# Patient Record
Sex: FEMALE | Race: WHITE | NOT HISPANIC OR LATINO | URBAN - METROPOLITAN AREA
[De-identification: names, ages, dates, MRNs, and addresses within clinical notes are randomized per-mention and may not be internally consistent; named-entity substitution may affect disease eponyms.]

---

## 2017-04-24 NOTE — PATIENT DISCUSSION
Continue: PreserVision AREDS 2 (vit c,e-ko-lxewc-lutein-zeaxan): capsule: [id number] mg-unit-mg-mg 1 capsule twice a day by mouth

## 2017-04-24 NOTE — PATIENT DISCUSSION
*CATARACTS, OU - BECOMING VISUALLY SIGNIFICANT BUT NOT BOTHERSOME TO PATIENT AT THIS TIME. SPEC RX OFFERED. FOLLOW AS SCHEDULED. WILL CONSIDER SURGERY IN 2018 OR 2019.

## 2017-04-24 NOTE — PATIENT DISCUSSION
Eyelid Lesion Counseling: The patient has presented with one or more eyelid or facial lesions with growth, change, irritation or abnormal appearance. An excision was recommended. The patient was offered a consultation appointment with an ocular plastics MD for further evaluation.

## 2017-07-26 ENCOUNTER — IMPORTED ENCOUNTER (OUTPATIENT)
Dept: URBAN - METROPOLITAN AREA CLINIC 9 | Facility: CLINIC | Age: 57
End: 2017-07-26

## 2018-04-18 NOTE — PATIENT DISCUSSION
FILEMON OU:  PRESCRIBE ARTIFICIAL TEARS BID - QID. DECREASE OUTDOOR EXPOSURE AND USE UV PROTECTION/ SUNGLASSES WITH OUTDOOR ACTIVITIES. RETURN FOR FOLLOW UP AS SCHEDULED.

## 2018-04-18 NOTE — PATIENT DISCUSSION
Continue: PreserVision AREDS 2 (vit c,c-xt-oifmg-lutein-zeaxan): capsule: [id number] mg-unit-mg-mg 1 capsule twice a day by mouth

## 2018-04-18 NOTE — PATIENT DISCUSSION
Pinguecula Counseling:  I have explained to the patient at length the diagnosis of pinguecula and its pathophysiology. I recommended the patient adequately protect their eyes from excessive UV light and dry, chelsey conditions. The use of artificial tears in dry conditions was encouraged. Return for follow-up as scheduled.

## 2018-10-29 ENCOUNTER — IMPORTED ENCOUNTER (OUTPATIENT)
Dept: URBAN - METROPOLITAN AREA CLINIC 9 | Facility: CLINIC | Age: 58
End: 2018-10-29

## 2019-11-25 ENCOUNTER — IMPORTED ENCOUNTER (OUTPATIENT)
Dept: URBAN - METROPOLITAN AREA CLINIC 9 | Facility: CLINIC | Age: 59
End: 2019-11-25

## 2020-11-30 ENCOUNTER — IMPORTED ENCOUNTER (OUTPATIENT)
Dept: URBAN - METROPOLITAN AREA CLINIC 9 | Facility: CLINIC | Age: 60
End: 2020-11-30

## 2020-11-30 PROBLEM — H25.13: Noted: 2020-11-30

## 2020-11-30 PROBLEM — H40.013: Noted: 2020-11-30

## 2020-11-30 PROBLEM — H04.123: Noted: 2020-11-30

## 2021-10-15 ASSESSMENT — KERATOMETRY
OD_AXISANGLE2_DEGREES: 62
OS_AXISANGLE2_DEGREES: 110
OD_AXISANGLE2_DEGREES: 42
OD_K2POWER_DIOPTERS: 44.75
OS_K2POWER_DIOPTERS: 44.75
OS_K1POWER_DIOPTERS: 44.25
OD_K1POWER_DIOPTERS: 44.25
OD_AXISANGLE_DEGREES: 132
OD_K1POWER_DIOPTERS: 44.25
OD_AXISANGLE_DEGREES: 156
OD_K1POWER_DIOPTERS: 43.75
OS_AXISANGLE2_DEGREES: 118
OS_AXISANGLE2_DEGREES: 132
OD_AXISANGLE2_DEGREES: 62
OD_K2POWER_DIOPTERS: 44.75
OS_AXISANGLE_DEGREES: 20
OS_K1POWER_DIOPTERS: 44.25
OS_K1POWER_DIOPTERS: 44.25
OD_AXISANGLE_DEGREES: 152
OS_AXISANGLE2_DEGREES: 121
OS_AXISANGLE_DEGREES: 28
OS_K1POWER_DIOPTERS: 44.25
OD_AXISANGLE2_DEGREES: 66
OD_K2POWER_DIOPTERS: 44.5
OS_K2POWER_DIOPTERS: 44.75
OS_AXISANGLE_DEGREES: 31
OD_AXISANGLE_DEGREES: 152
OD_K1POWER_DIOPTERS: 44
OS_AXISANGLE_DEGREES: 42
OS_K2POWER_DIOPTERS: 44.75
OS_K2POWER_DIOPTERS: 44.75
OD_K2POWER_DIOPTERS: 44.75

## 2021-10-15 ASSESSMENT — VISUAL ACUITY
OD_CC: 20/20 SN
OS_SC: 20/40 SN
OS_SC: 20/60 SN
OS_CC: 20/20 SN
OD_CC: 20/20 SN
OD_SC: 20/50 - SN
OS_CC: 20/20 SN
OD_CC: 20/20 - SN
OD_SC: 20/40 - SN
OS_CC: 20/20 - SN
OS_CC: 20/25 SN
OD_CC: 20/20 SN
OD_SC: 20/50 SN
OS_CC: 20/20 SN
OS_CC: 20/30 - SN
OS_SC: 20/50 - SN
OD_CC: 20/25 SN
OS_CC: 20/20 SN
OS_SC: 20/50 SN
OD_SC: 20/50 SN
OD_CC: 20/20 SN

## 2021-10-15 ASSESSMENT — TONOMETRY
OD_IOP_MMHG: 16
OS_IOP_MMHG: 17
OS_IOP_MMHG: 15
OD_IOP_MMHG: 15
OD_IOP_MMHG: 16
OS_IOP_MMHG: 19
OD_IOP_MMHG: 17
OS_IOP_MMHG: 15

## 2022-02-07 ENCOUNTER — ESTABLISHED PATIENT (OUTPATIENT)
Dept: URBAN - METROPOLITAN AREA CLINIC 9 | Facility: CLINIC | Age: 62
End: 2022-02-07

## 2022-02-07 DIAGNOSIS — H25.13: ICD-10-CM

## 2022-02-07 DIAGNOSIS — H40.013: ICD-10-CM

## 2022-02-07 DIAGNOSIS — H52.223: ICD-10-CM

## 2022-02-07 PROCEDURE — 92014 COMPRE OPH EXAM EST PT 1/>: CPT

## 2022-02-07 PROCEDURE — 92015 DETERMINE REFRACTIVE STATE: CPT

## 2022-02-07 PROCEDURE — 92133 CPTRZD OPH DX IMG PST SGM ON: CPT

## 2022-02-07 ASSESSMENT — TONOMETRY
OD_IOP_MMHG: 14
OS_IOP_MMHG: 14

## 2022-02-07 ASSESSMENT — KERATOMETRY
OS_AXISANGLE_DEGREES: 35
OS_K2POWER_DIOPTERS: 44.75
OD_AXISANGLE_DEGREES: 150
OD_K1POWER_DIOPTERS: 44.25
OS_K1POWER_DIOPTERS: 44.50
OD_K2POWER_DIOPTERS: 44.75
OS_AXISANGLE2_DEGREES: 125
OD_AXISANGLE2_DEGREES: 60

## 2022-02-07 ASSESSMENT — VISUAL ACUITY
OU_CC: J1
OS_SC: 20/100
OD_SC: 20/70
OS_CC: 20/20
OD_CC: J2
OU_SC: 20/50
OS_CC: J1-1
OD_CC: 20/20-2
OU_CC: 20/20

## 2022-06-18 RX ORDER — ESCITALOPRAM OXALATE 20 MG/1
TABLET ORAL
COMMUNITY

## 2022-06-18 RX ORDER — ROSUVASTATIN CALCIUM 20 MG/1
TABLET, COATED ORAL
COMMUNITY

## 2023-06-12 ENCOUNTER — ESTABLISHED PATIENT (OUTPATIENT)
Dept: URBAN - METROPOLITAN AREA CLINIC 9 | Facility: CLINIC | Age: 63
End: 2023-06-12

## 2023-06-12 DIAGNOSIS — H25.13: ICD-10-CM

## 2023-06-12 DIAGNOSIS — H40.013: ICD-10-CM

## 2023-06-12 PROCEDURE — 92014 COMPRE OPH EXAM EST PT 1/>: CPT

## 2023-06-12 PROCEDURE — 92133 CPTRZD OPH DX IMG PST SGM ON: CPT

## 2023-06-12 PROCEDURE — 92015 DETERMINE REFRACTIVE STATE: CPT

## 2023-06-12 ASSESSMENT — VISUAL ACUITY
OD_CC: 20/20
OS_CC: 20/20
OD_GLARE: 20/80
OS_GLARE: 20/80
OD_SC: 20/70
OU_SC: 20/70
OU_CC: 20/20
OS_SC: 20/100

## 2023-06-12 ASSESSMENT — KERATOMETRY
OS_K2POWER_DIOPTERS: 44.50
OD_K1POWER_DIOPTERS: 44.25
OS_AXISANGLE_DEGREES: 0
OD_AXISANGLE2_DEGREES: 55
OS_K1POWER_DIOPTERS: 44.50
OD_K2POWER_DIOPTERS: 44.50
OS_AXISANGLE2_DEGREES: 90
OD_AXISANGLE_DEGREES: 145

## 2023-06-12 ASSESSMENT — TONOMETRY
OD_IOP_MMHG: 16
OS_IOP_MMHG: 14

## 2024-06-12 ENCOUNTER — ESTABLISHED PATIENT (OUTPATIENT)
Facility: LOCATION | Age: 64
End: 2024-06-12

## 2024-06-12 DIAGNOSIS — H52.223: ICD-10-CM

## 2024-06-12 DIAGNOSIS — H40.013: ICD-10-CM

## 2024-06-12 DIAGNOSIS — H25.13: ICD-10-CM

## 2024-06-12 DIAGNOSIS — H04.123: ICD-10-CM

## 2024-06-12 PROCEDURE — 92014 COMPRE OPH EXAM EST PT 1/>: CPT

## 2024-06-12 PROCEDURE — 92133 CPTRZD OPH DX IMG PST SGM ON: CPT

## 2024-06-12 PROCEDURE — 92015 DETERMINE REFRACTIVE STATE: CPT

## 2024-06-12 ASSESSMENT — VISUAL ACUITY
OD_SC: 20/80-1
OS_CC: 20/20-1
OS_SC: 20/70-1
OU_SC: 20/70-1
OU_CC: 20/20-1
OD_CC: 20/30+1

## 2024-06-12 ASSESSMENT — TONOMETRY
OD_IOP_MMHG: 16
OS_IOP_MMHG: 16

## 2024-06-12 ASSESSMENT — KERATOMETRY
OS_K2POWER_DIOPTERS: 44.50
OD_AXISANGLE_DEGREES: 130
OD_AXISANGLE2_DEGREES: 40
OD_K2POWER_DIOPTERS: 44.50
OD_K1POWER_DIOPTERS: 44.00
OS_K1POWER_DIOPTERS: 44.50
OS_AXISANGLE2_DEGREES: 90
OS_AXISANGLE_DEGREES: 0